# Patient Record
Sex: MALE | Race: WHITE | NOT HISPANIC OR LATINO | URBAN - NONMETROPOLITAN AREA
[De-identification: names, ages, dates, MRNs, and addresses within clinical notes are randomized per-mention and may not be internally consistent; named-entity substitution may affect disease eponyms.]

---

## 2023-11-09 ENCOUNTER — OFFICE (OUTPATIENT)
Dept: URBAN - NONMETROPOLITAN AREA CLINIC 5 | Facility: CLINIC | Age: 18
End: 2023-11-09

## 2023-11-09 VITALS
BODY MASS INDEX: 17.18 KG/M2 | HEIGHT: 69 IN | DIASTOLIC BLOOD PRESSURE: 69 MMHG | SYSTOLIC BLOOD PRESSURE: 111 MMHG | HEART RATE: 58 BPM | WEIGHT: 116 LBS | RESPIRATION RATE: 18 BRPM

## 2023-11-09 DIAGNOSIS — K59.09 OTHER CONSTIPATION: ICD-10-CM

## 2023-11-09 PROCEDURE — 99203 OFFICE O/P NEW LOW 30 MIN: CPT | Performed by: INTERNAL MEDICINE

## 2023-11-09 NOTE — SERVICENOTES
Given patient's acute onset constipation I counseled him on doing a bowel clean out with 119 g of MiraLax in 32 oz of Gatorade and repeat if necessary.  After this have recommended he start on Benefiber daily.  He needs to ensure adequate H2O intake.  Will plan to see back in clinic in 2 months.  He will contact us in the interim and if with persistent or recurrent constipation will plan for colonoscopy given acute onset of symptoms.

## 2023-11-09 NOTE — SERVICEHPINOTES
Jose Boston   is a   17 yo  male  with no significant past medical history who presents to establish care for constipation and stomach cramps.  Patient reports approximately 1 month ago he became constipated.  He has been taking a Dulcolax every few days which has allowed him to move the bowels some.  He denies similar episodes in the past.  He denies any bright red blood per rectum, melena, weight loss, nausea, or vomiting.  He denies any family history of colon cancer or inflammatory bowel disease.  He does vape tobacco and occasionally drinks alcohol.  He denies marijuana or NSAID use.  He is from [facility] and is studying business.  He has never been on a fiber supplement daily.